# Patient Record
Sex: FEMALE | Race: WHITE | Employment: FULL TIME | URBAN - METROPOLITAN AREA
[De-identification: names, ages, dates, MRNs, and addresses within clinical notes are randomized per-mention and may not be internally consistent; named-entity substitution may affect disease eponyms.]

---

## 2017-02-03 RX ORDER — SUMATRIPTAN 100 MG/1
TABLET, FILM COATED ORAL
Qty: 12 TAB | Refills: 0 | Status: SHIPPED | OUTPATIENT
Start: 2017-02-03 | End: 2017-07-11 | Stop reason: SDUPTHER

## 2017-03-15 ENCOUNTER — HOSPITAL ENCOUNTER (OUTPATIENT)
Dept: LAB | Age: 52
Discharge: HOME | End: 2017-03-15
Payer: COMMERCIAL

## 2017-03-15 ENCOUNTER — OFFICE VISIT (OUTPATIENT)
Dept: FAMILY MEDICINE CLINIC | Age: 52
End: 2017-03-15

## 2017-03-15 VITALS
DIASTOLIC BLOOD PRESSURE: 87 MMHG | RESPIRATION RATE: 16 BRPM | HEIGHT: 70 IN | WEIGHT: 258 LBS | TEMPERATURE: 98.2 F | HEART RATE: 68 BPM | SYSTOLIC BLOOD PRESSURE: 138 MMHG | OXYGEN SATURATION: 97 % | BODY MASS INDEX: 36.94 KG/M2

## 2017-03-15 DIAGNOSIS — Z12.39 BREAST CANCER SCREENING: ICD-10-CM

## 2017-03-15 DIAGNOSIS — Z01.419 WELL WOMAN EXAM WITH ROUTINE GYNECOLOGICAL EXAM: ICD-10-CM

## 2017-03-15 DIAGNOSIS — Z12.4 CERVICAL CANCER SCREENING: ICD-10-CM

## 2017-03-15 DIAGNOSIS — R13.10 DYSPHAGIA, UNSPECIFIED TYPE: ICD-10-CM

## 2017-03-15 DIAGNOSIS — Z01.419 WELL WOMAN EXAM WITH ROUTINE GYNECOLOGICAL EXAM: Primary | ICD-10-CM

## 2017-03-15 PROBLEM — R13.11 ORAL PHASE DYSPHAGIA: Status: ACTIVE | Noted: 2017-03-15

## 2017-03-15 PROBLEM — Z12.11 COLON CANCER SCREENING: Status: ACTIVE | Noted: 2017-03-15

## 2017-03-15 PROBLEM — R91.1 LUNG NODULE: Status: ACTIVE | Noted: 2017-03-15

## 2017-03-15 PROCEDURE — 87624 HPV HI-RISK TYP POOLED RSLT: CPT | Performed by: FAMILY MEDICINE

## 2017-03-15 PROCEDURE — 88175 CYTOPATH C/V AUTO FLUID REDO: CPT | Performed by: FAMILY MEDICINE

## 2017-03-15 NOTE — PROGRESS NOTES
Chief Complaint   Patient presents with    Well Woman     1. Have you been to the ER, urgent care clinic since your last visit? Hospitalized since your last visit? no    2. Have you seen or consulted any other health care providers outside of the [facility] since your last visit? Include any pap smears or colon screening.  no

## 2017-03-15 NOTE — MR AVS SNAPSHOT
Visit Information Date & Time Provider Department Dept. Phone Encounter #  
 3/15/2017  9:20 AM Adarsh Yeboah, [facility] [phone] [id number] Upcoming Health Maintenance Date Due DTaP/Tdap/Td series (1 - Tdap) 12/27/1987 PAP AKA CERVICAL CYTOLOGY 6/12/2016 INFLUENZA AGE 9 TO ADULT 8/1/2016 FOBT Q 1 YEAR AGE 50-75 12/27/2016 BREAST CANCER SCRN MAMMOGRAM 11/4/2017 Allergies as of 3/15/2017  Review Complete On: 3/15/2017 By: Moshe Cade LPN No Known Allergies Current Immunizations  Reviewed on 12/9/2014 Name Date Influenza Vaccine 11/10/2014 Not reviewed this visit You Were Diagnosed With   
  
 Codes Comments Well woman exam with routine gynecological exam    -  Primary ICD-10-CM: K40.567 ICD-9-CM: V72.31 Dysphagia, unspecified type     ICD-10-CM: R13.10 ICD-9-CM: 787.20 Vitals BP Pulse Temp Resp Height(growth percentile) Weight(growth percentile) 138/87 (BP 1 Location: Left arm, BP Patient Position: Sitting) 68 98.2 °F (36.8 °C) (Oral) 16 5' 10\" (1.778 m) 258 lb (117 kg) LMP SpO2 BMI OB Status Smoking Status 02/10/2017 97% 37.02 kg/m2 Having regular periods Never Smoker Vitals History BMI and BSA Data Body Mass Index Body Surface Area 37.02 kg/m 2 2.4 m 2 Preferred Pharmacy Pharmacy Name Phone [facility] [address]  [facility] [phone] Your Updated Medication List  
  
   
This list is accurate as of: 3/15/17 10:09 AM.  Always use your most recent med list.  
  
  
  
  
 SUMAtriptan 100 mg tablet Commonly known as:  IMITREX  
TAKE 1 TABLET ONCE AS NEEDED FOR MIGRAINE FOR ONE DOSE We Performed the Following CBC WITH AUTOMATED DIFF [07842 CPT(R)] LIPID PANEL [51088 CPT(R)] METABOLIC PANEL, COMPREHENSIVE [37460 CPT(R)] PAP IG, APTIMA HPV AND RFX 16/18,45 ([id number]) [[id number] Custom] REFERRAL TO GASTROENTEROLOGY [JJW85 Custom] Comments:  
 Please evaluate patient for difficulty swallowing TSH 3RD GENERATION [08320 CPT(R)] VITAMIN D, 25 HYDROXY [id number] CPT(R)] To-Do List   
 04/14/2017 Imaging:  MUMTAZ MAMMO BI SCREENING INCL CAD Referral Information Referral ID Referred By Referred To  
  
 [id number] [facility] 030 66 [phone] 83 [place], [facility] Vis[facility] Status Start Date End Date 1 New Request 3/15/17 3/15/18 If your referral has a status of pending review or denied, additional information will be sent to support the outcome of this decision. Introducing [facility]! Dear Louann Wellington: Thank you for requesting a [employer] account. Our records indicate that you already have an active [employer] account. You can access your account anytime at [email or web address] Did you know that you can access your hospital and ER discharge instructions at any time in [employer]? You can also review all of your test results from your hospital stay or ER visit. Additional Information If you have questions, please visit the Frequently Asked Questions section of the [employer] website at [email or web address] Remember, [employer] is NOT to be used for urgent needs. For medical emergencies, dial 911. Now available from your iPhone and Android! Please provide this summary of care documentation to your next provider. Your primary care clinician is listed as [address] [place] [address]. If you have any questions after today's visit, please call [phone].

## 2017-03-15 NOTE — PROGRESS NOTES
Presents for annual exam    States that \"life is ok\"    Health wise -- nothing chronic or different    States that she is craving salt    States that she has not been sick    States that periods are tapering off  Not bleeding like second half of last year  Last annual exam was in 2015   Periods were very regular at 24 days  Now stretched out to six weeks  Sometimes will completely miss a month  Mother went through [facility] [employer], [facility] in her 52's    Denies any breast problems    Always had digestive problems    Always trying to do weight loss program    Colonoscopy was done 6-7 years ago  Had EGD and colonoscopy  Swallowed food and does not go down  Notice it with certain foods -- only happens with certain foods -- has been going on for years  Able to still breathe  Was also having blood in her stool at the time that colonoscopy was done  Does not feel like she is choking    Migraine headaches are much better    Works for [employer] around the [address] [place] [address] in [employer] [facility] [employer] [facility] [place]    4 mm pulmonary nodule seen on CT scan abd 2013 -- per radiology, no followup required due to lack of risk factors (non-smoker, denies second hand exposure)    Subjective:   49 y.o. female for Well Woman Check. Patient's last menstrual period was 02/10/2017. Social History: single partner, contraception - vasectomy. Pertinent past medical history:    Patient Active Problem List   Diagnosis Code    Depression F32.9    Migraine headache G43.909    Lung nodule R91.1    Breast cancer screening Z12.39    Colon cancer screening Z12.11    Oral phase dysphagia R13.11     Current Outpatient Prescriptions   Medication Sig Dispense Refill    SUMAtriptan (IMITREX) 100 mg tablet TAKE 1 TABLET ONCE AS NEEDED FOR MIGRAINE FOR ONE DOSE 12 Tab 0     No Known Allergies     ROS:  Feeling well. No dyspnea or chest pain on exertion. No abdominal pain, change in bowel habits, black or bloody stools.   + difficulty swallowing at times; has had EGD which was normal a few years ago. No urinary tract symptoms. GYN ROS: no breast pain or new or enlarging lumps on self exam, she complains of irregular menses. No neurological complaints. Objective:     Visit Vitals    /87 (BP 1 Location: Left arm, BP Patient Position: Sitting)    Pulse 68    Temp 98.2 °F (36.8 °C) (Oral)    Resp 16    Ht 5' 10\" (1.778 m)    Wt 258 lb (117 kg)    LMP 02/10/2017    SpO2 97%    BMI 37.02 kg/m2     The patient appears well, alert, oriented x 3, in no distress. ENT normal.  Neck supple. No adenopathy or thyromegaly. LAN. Lungs are clear, good air entry, no wheezes, rhonchi or rales. S1 and S2 normal, no murmurs, regular rate and rhythm. Abdomen obese, soft without tenderness, guarding, mass or organomegaly. Extremities show no edema, normal peripheral pulses. Neurological is normal, no focal findings. BREAST EXAM: breasts appear normal, no suspicious masses, no skin or nipple changes or axillary nodes    PELVIC EXAM: normal external genitalia, vulva, vagina, cervix, uterus and adnexa    Assessment/Plan:   well woman  mammogram  pap smear  return annually or prn    ICD-10-CM ICD-9-CM    1. Well woman exam with routine gynecological exam Z01.419 V72.31 PAP IG, APTIMA HPV AND RFX 16/18,45 ([id number])      LIPID PANEL      METABOLIC PANEL, COMPREHENSIVE      CBC WITH AUTOMATED DIFF      MUMTAZ MAMMO BI SCREENING INCL CAD      VITAMIN D, 25 HYDROXY      TSH 3RD GENERATION   2. Dysphagia, unspecified type R13.10 787.20 REFERRAL TO GASTROENTEROLOGY   3. Breast cancer screening Z12.39 V76.10    4. Cervical cancer screening Z12.4 V76.2    5.  Well woman exam with routine gynecological exam Z01.419 V72.31 PAP IG, APTIMA HPV AND RFX 16/18,45 ([id number])      LIPID PANEL      METABOLIC PANEL, COMPREHENSIVE      CBC WITH AUTOMATED DIFF      MUMTAZ MAMMO BI SCREENING INCL CAD      VITAMIN D, 25 HYDROXY      TSH 3RD GENERATION    [V72.31]

## 2017-03-15 NOTE — LETTER
3/21/2017 1:32 PM 
 
Ms. Smallstara Anchors [address] [facility] [phone] Dear Claudell Anchors: 
 
Please find your most recent results below. Resulted Orders CX-VAG CYTOLOGY Narrative Marco Antonio [address] [place] [address] Pkwy      [address]         [address] [facility], [address]     [place], [address] [place] [address] 
[phone] Fax# [phone]    Fax# [phone]      Fax# [phone] 
 
========================================================================== 
                       * * * CYTOPATHOLOGY REPORT* * *   
========================================================================== 
GYNECOLOGICAL * * *Procedures/Addenda Present * * * Patient:  Isidro Balwinder             Specimen #:  [id number] Age:  1965 (Age: 49)                Date of Procedure: 3/15/2017 Sex:  F                                  Date of Receipt:  3/16/2017 Hospital#:  [id number]               Date of Report: 3/20/2017 Med. Record #:  [id number] Location:  [facility]) Physician(s):  Jaki Bird MD 
 
   
 
* * * CLINICAL HISTORY* * * Date of Last Menstrual Period:  02/10/2017 ADDITIONAL PATIENT INFORMATION:  
RFX 12 , 25 / 39 HPV REGARDLESS:  
YES  
SOURCE: 
A: Cervical/Endocervical Imaged Processed Thin Prep 
 
 
============================================================================ 
                                * * * FINAL INTERPRETATION* * * Cervical/Endocervical Imaged Processed Thin Prep Satisfactory for evaluation. NEGATIVE FOR INTRAEPITHELIAL LESION OR MALIGNANCY. [place] Angel,  CT (AS Jonathan Lozano  HPV HR                     
      
 
 
 Interpretation Test: HPV, high-risk Result: NEGATIVE Reference Interval: Negative This high-risk HPV test detects fourteen high-risk types 
(16/18/31/33/35/39/45/51/52/56/58/59/66/68) without differentiation, using 
nucleic acid amplification method. Test performed by: [address] [place] [address]  Dr. Derek Boyle, [facility] Phone number:  [phone] Candance Huger * * *Electronically Signed* * * 
3/20/2017 ICD10 Codes: 
 Q10.510 The Pap test is a screening procedure to aid in the detection of cervical 
cancer and its precursors. It should not be used as the sole means of 
detecting cervical cancer. As with any laboratory test, false negative 
and false positive results are known to occur. RECOMMENDATIONS: 
None. Keep up the good work! Negative PAP smear and negative HPV HR Please call me if you have any questions: [phone] Sincerely, 
Dorian No

## 2017-03-16 LAB
25(OH)D3+25(OH)D2 SERPL-MCNC: 18.3 NG/ML (ref 30–100)
ALBUMIN SERPL-MCNC: 4.2 G/DL (ref 3.5–5.5)
ALBUMIN/GLOB SERPL: 1.9 {RATIO} (ref 1.2–2.2)
ALP SERPL-CCNC: 60 IU/L (ref 39–117)
ALT SERPL-CCNC: 33 IU/L (ref 0–32)
AST SERPL-CCNC: 24 IU/L (ref 0–40)
BASOPHILS # BLD AUTO: 0 X10E3/UL (ref 0–0.2)
BASOPHILS NFR BLD AUTO: 0 %
BILIRUB SERPL-MCNC: 0.4 MG/DL (ref 0–1.2)
BUN SERPL-MCNC: 9 MG/DL (ref 6–24)
BUN/CREAT SERPL: 12 (ref 9–23)
CALCIUM SERPL-MCNC: 9.4 MG/DL (ref 8.7–10.2)
CHLORIDE SERPL-SCNC: 101 MMOL/L (ref 96–106)
CHOLEST SERPL-MCNC: 140 MG/DL (ref 100–199)
CO2 SERPL-SCNC: 23 MMOL/L (ref 18–29)
CREAT SERPL-MCNC: 0.74 MG/DL (ref 0.57–1)
EOSINOPHIL # BLD AUTO: 0 X10E3/UL (ref 0–0.4)
EOSINOPHIL NFR BLD AUTO: 1 %
ERYTHROCYTE [DISTWIDTH] IN BLOOD BY AUTOMATED COUNT: 14.9 % (ref 12.3–15.4)
GLOBULIN SER CALC-MCNC: 2.2 G/DL (ref 1.5–4.5)
GLUCOSE SERPL-MCNC: 100 MG/DL (ref 65–99)
HCT VFR BLD AUTO: 38.1 % (ref 34–46.6)
HDLC SERPL-MCNC: 59 MG/DL
HGB BLD-MCNC: 12.8 G/DL (ref 11.1–15.9)
IMM GRANULOCYTES # BLD: 0 X10E3/UL (ref 0–0.1)
IMM GRANULOCYTES NFR BLD: 0 %
INTERPRETATION, 910389: NORMAL
LDLC SERPL CALC-MCNC: 62 MG/DL (ref 0–99)
LYMPHOCYTES # BLD AUTO: 1.9 X10E3/UL (ref 0.7–3.1)
LYMPHOCYTES NFR BLD AUTO: 37 %
MCH RBC QN AUTO: 28.9 PG (ref 26.6–33)
MCHC RBC AUTO-ENTMCNC: 33.6 G/DL (ref 31.5–35.7)
MCV RBC AUTO: 86 FL (ref 79–97)
MONOCYTES # BLD AUTO: 0.4 X10E3/UL (ref 0.1–0.9)
MONOCYTES NFR BLD AUTO: 8 %
NEUTROPHILS # BLD AUTO: 2.8 X10E3/UL (ref 1.4–7)
NEUTROPHILS NFR BLD AUTO: 54 %
PLATELET # BLD AUTO: 206 X10E3/UL (ref 150–379)
POTASSIUM SERPL-SCNC: 4.7 MMOL/L (ref 3.5–5.2)
PROT SERPL-MCNC: 6.4 G/DL (ref 6–8.5)
RBC # BLD AUTO: 4.43 X10E6/UL (ref 3.77–5.28)
SODIUM SERPL-SCNC: 137 MMOL/L (ref 134–144)
TRIGL SERPL-MCNC: 96 MG/DL (ref 0–149)
TSH SERPL DL<=0.005 MIU/L-ACNC: 0.77 UIU/ML (ref 0.45–4.5)
VLDLC SERPL CALC-MCNC: 19 MG/DL (ref 5–40)
WBC # BLD AUTO: 5.2 X10E3/UL (ref 3.4–10.8)

## 2017-03-17 RX ORDER — ERGOCALCIFEROL 1.25 MG/1
50000 CAPSULE ORAL
Qty: 12 CAP | Refills: 0 | Status: SHIPPED | OUTPATIENT
Start: 2017-03-17 | End: 2017-07-11 | Stop reason: SDUPTHER

## 2017-03-17 NOTE — PROGRESS NOTES
Normal cholesterol  Normal kidney, liver, and glucose   No anemia  Low vitamin D level    Will send in high dose vitamin D to pharmacy, to be taken weekly for 12 weeks, then 1000 international units per day.

## 2017-06-29 ENCOUNTER — HOSPITAL ENCOUNTER (OUTPATIENT)
Dept: MAMMOGRAPHY | Age: 52
Discharge: HOME | End: 2017-06-29
Attending: FAMILY MEDICINE
Payer: COMMERCIAL

## 2017-06-29 DIAGNOSIS — Z01.419 WELL WOMAN EXAM WITH ROUTINE GYNECOLOGICAL EXAM: ICD-10-CM

## 2017-06-29 PROCEDURE — 77067 SCR MAMMO BI INCL CAD: CPT

## 2017-06-30 DIAGNOSIS — R79.89 LOW SERUM VITAMIN D: Primary | ICD-10-CM

## 2017-07-02 RX ORDER — ERGOCALCIFEROL 1.25 MG/1
CAPSULE ORAL
Qty: 12 CAP | Refills: 0 | OUTPATIENT
Start: 2017-07-02

## 2017-07-02 NOTE — TELEPHONE ENCOUNTER
May not need to continue high dose   May be able to take [phone] international units per day  Needs blood test (vitamin D level)

## 2017-07-07 ENCOUNTER — TELEPHONE (OUTPATIENT)
Dept: FAMILY MEDICINE CLINIC | Age: 52
End: 2017-07-07

## 2017-07-07 NOTE — TELEPHONE ENCOUNTER
Patient called to say the pharmacy told her the medication refill of Vitamin D was denied. She called to ask for the reason why. Informed her a message would be sent to nurse and asked if she had read her my chart e-mail from the office. She said \"no\".

## 2017-07-07 NOTE — TELEPHONE ENCOUNTER
Spoke to patient regarding medication denial. She understands she needs to return for lab work ing which she has made an appointment for.

## 2017-07-10 ENCOUNTER — LAB ONLY (OUTPATIENT)
Dept: FAMILY MEDICINE CLINIC | Age: 52
End: 2017-07-10

## 2017-07-10 DIAGNOSIS — E55.9 VITAMIN D DEFICIENCY: ICD-10-CM

## 2017-07-10 DIAGNOSIS — R79.89 LOW SERUM VITAMIN D: Primary | ICD-10-CM

## 2017-07-10 NOTE — LETTER
7/13/2017 2:44 PM 
 
Ms. Ketty Mora [address] [facility] [phone] Dear Ketty Mora: 
 
Please find your most recent results below. Resulted Orders VITAMIN D, 25 HYDROXY Result Value Ref Range VITAMIN D, 25-HYDROXY 22.3 (L) 30.0 - 100.0 ng/mL Comment:  
   Vitamin D deficiency has been defined by the [facility] practice guideline as a 
level of serum 25-OH vitamin D less than 20 ng/mL (1,2). The Endocrine Society went on to further define vitamin D 
insufficiency as a level between 21 and 29 ng/mL (2). 1. IOM ([place] of Medicine). 2010. Dietary reference 
   intakes for calcium and D. [facility]: The 
   [facility]. 2. Sukh MF, Trent [place], Eun LARSEN, et al. 
   Evaluation, treatment, and prevention of vitamin D 
   deficiency: an Endocrine Society clinical practice 
   guideline. JCEM. 2011 Jul; 96(7):1911-30. Narrative Performed at:  [address]  [id number] : Víctor Mims MD, Phone:  [phone] RECOMMENDATIONS: 
 
   
Vitamin D is still very low Will order high dose vitamin D for three months -- then can recheck blood level Please call me if you have any questions: [phone] Sincerely, Lab Sffp

## 2017-07-11 DIAGNOSIS — E55.9 VITAMIN D DEFICIENCY: Primary | ICD-10-CM

## 2017-07-11 LAB — 25(OH)D3+25(OH)D2 SERPL-MCNC: 22.3 NG/ML (ref 30–100)

## 2017-07-11 RX ORDER — ERGOCALCIFEROL 1.25 MG/1
50000 CAPSULE ORAL
Qty: 12 CAP | Refills: 0 | Status: SHIPPED | OUTPATIENT
Start: 2017-07-11 | End: 2019-01-16

## 2017-07-11 NOTE — PROGRESS NOTES
Vitamin D is still very low  Will order high dose vitamin D for three months -- then can recheck blood level

## 2017-07-12 RX ORDER — SUMATRIPTAN 100 MG/1
TABLET, FILM COATED ORAL
Qty: 12 TAB | Refills: 0 | Status: SHIPPED | OUTPATIENT
Start: 2017-07-12

## 2017-08-10 ENCOUNTER — TELEPHONE (OUTPATIENT)
Dept: FAMILY MEDICINE CLINIC | Age: 52
End: 2017-08-10

## 2017-08-10 NOTE — TELEPHONE ENCOUNTER
Patient states per Lab bev there was no condition code attached to vitamin D draw and which she had to pay total cost of $232.00      Please call [phone]

## 2017-08-15 NOTE — TELEPHONE ENCOUNTER
Tried to call [patient X2. Information faxed to [employer] and asked to re-file with insurance.

## 2018-05-23 ENCOUNTER — OFFICE VISIT (OUTPATIENT)
Dept: FAMILY MEDICINE CLINIC | Age: 53
End: 2018-05-23

## 2018-05-23 VITALS
DIASTOLIC BLOOD PRESSURE: 78 MMHG | SYSTOLIC BLOOD PRESSURE: 126 MMHG | RESPIRATION RATE: 18 BRPM | HEIGHT: 70 IN | BODY MASS INDEX: 36.94 KG/M2 | TEMPERATURE: 97.7 F | HEART RATE: 80 BPM | WEIGHT: 258 LBS | OXYGEN SATURATION: 98 %

## 2018-05-23 DIAGNOSIS — K30 INDIGESTION: ICD-10-CM

## 2018-05-23 DIAGNOSIS — R79.89 LOW SERUM VITAMIN D: ICD-10-CM

## 2018-05-23 DIAGNOSIS — R53.83 FATIGUE, UNSPECIFIED TYPE: ICD-10-CM

## 2018-05-23 DIAGNOSIS — Z01.419 WELL WOMAN EXAM: Primary | ICD-10-CM

## 2018-05-23 RX ORDER — CHOLECALCIFEROL TAB 125 MCG (5000 UNIT) 125 MCG
TAB ORAL DAILY
COMMUNITY
End: 2019-01-16

## 2018-05-23 RX ORDER — RANITIDINE 300 MG/1
300 TABLET ORAL DAILY
Qty: 30 TAB | Refills: 0 | Status: SHIPPED | OUTPATIENT
Start: 2018-05-23 | End: 2019-01-16

## 2018-05-23 NOTE — PROGRESS NOTES
Chief Complaint   Patient presents with    Fatigue     for about 2 months    Indigestion     1 month     1. Have you been to the ER, urgent care clinic since your last visit? Hospitalized since your last visit? No    2. Have you seen or consulted any other health care providers outside of the [address] [place] [address] since your last visit? Include any pap smears or colon screening.  No

## 2018-05-23 NOTE — MR AVS SNAPSHOT
[address] 
[phone] Patient: Claire Albarran MRN: [id number] :1965 Visit Information Date & Time Provider Department Dept. Phone Encounter #  
 2018 11:00 AM Thiago RIDER Travis Fox, [address] [place] [address] [phone] [id number] Follow-up Instructions Return in about 1 year (around 2019), or if symptoms worsen or fail to improve. Upcoming Health Maintenance Date Due FOBT Q 1 YEAR AGE 50-75 2016 DTaP/Tdap/Td series (1 - Tdap) 2019* Influenza Age 5 to Adult 2018 BREAST CANCER SCRN MAMMOGRAM 2019 PAP AKA CERVICAL CYTOLOGY 3/15/2020 *Topic was postponed. The date shown is not the original due date. Allergies as of 2018  Review Complete On: 2018 By: Chivo Mehta LPN No Known Allergies Current Immunizations  Reviewed on 2014 Name Date Influenza Vaccine 11/10/2014 Not reviewed this visit You Were Diagnosed With   
  
 Codes Comments Well woman exam    -  Primary ICD-10-CM: J48.016 ICD-9-CM: V72.31   
 BMI 37.0-37.9, adult     ICD-10-CM: Z68.37 ICD-9-CM: V85.37 Fatigue, unspecified type     ICD-10-CM: R53.83 ICD-9-CM: 780.79 Indigestion     ICD-10-CM: K30 ICD-9-CM: 536.8 Low serum vitamin D     ICD-10-CM: R79.89 ICD-9-CM: 790.6 Vitals BP Pulse Temp Resp Height(growth percentile) Weight(growth percentile) 126/78 (BP 1 Location: Right arm, BP Patient Position: Sitting) 80 97.7 °F (36.5 °C) (Oral) 18 5' 10\" (1.778 m) 258 lb (117 kg) LMP SpO2 BMI OB Status Smoking Status 03/15/2018 (Approximate) 98% 37.02 kg/m2 Having regular periods Never Smoker Vitals History BMI and BSA Data Body Mass Index Body Surface Area 37.02 kg/m 2 2.4 m 2 Preferred Pharmacy Pharmacy Name Phone [facility]ORE [address] AT  [facility] [phone] Your Updated Medication List  
  
   
This list is accurate as of 5/23/18 11:53 AM.  Always use your most recent med list.  
  
  
  
  
 cholecalciferol (VITAMIN D3) 5,000 unit Tab tablet Commonly known as:  VITAMIN D3 Take  by mouth daily. ergocalciferol 50,000 unit capsule Commonly known as:  ERGOCALCIFEROL Take 1 Cap by mouth every seven (7) days. raNITIdine 300 mg tablet Commonly known as:  ZANTAC Take 1 Tab by mouth daily. SUMAtriptan 100 mg tablet Commonly known as:  IMITREX  
TAKE 1 TABLET ONCE AS NEEDED FOR MIGRAINE FOR ONE DOSE Prescriptions Sent to Pharmacy Refills  
 raNITIdine (ZANTAC) 300 mg tablet 0 Sig: Take 1 Tab by mouth daily. Class: Normal  
 [facility] [place] [facility] [address] AT [facility] of [address] [address] ([facility] Ph #: [phone] Route: Oral  
  
We Performed the Following CBC W/O DIFF [90015 CPT(R)] LIPID PANEL [77430 CPT(R)] METABOLIC PANEL, COMPREHENSIVE [44947 CPT(R)] OCCULT BLOOD, IMMUNOASSAY (FIT) [id number] CPT(R)] REFERRAL TO SLEEP STUDIES [REF99 Custom] Comments:  
 Please evaluate patient for sleep apnea. .  
 TSH RFX ON ABNORMAL TO FREE T4 [[id number] Custom] VITAMIN B12 [id number] CPT(R)] VITAMIN D, 25 HYDROXY [id number] CPT(R)] Follow-up Instructions Return in about 1 year (around 5/23/2019), or if symptoms worsen or fail to improve. Referral Information Referral ID Referred By Referred To  
  
 [id number] Ady PAYTON [address] Phone: [phone] Fax: [phone] Visits Status Start Date End Date 1 New Request 5/23/18 5/23/19  If your referral has a status of pending review or denied, additional information will be sent to support the outcome of this decision. Patient Instructions Well Visit, Women 48 to 72: Care Instructions Your Care Instructions Physical exams can help you stay healthy. Your doctor has checked your overall health and may have suggested ways to take good care of yourself. He or she also may have recommended tests. At home, you can help prevent illness with healthy eating, regular exercise, and other steps. Follow-up care is a key part of your treatment and safety. Be sure to make and go to all appointments, and call your doctor if you are having problems. It's also a good idea to know your test results and keep a list of the medicines you take. How can you care for yourself at home? · Reach and stay at a healthy weight. This will lower your risk for many problems, such as obesity, diabetes, heart disease, and high blood pressure. · Get at least 30 minutes of exercise on most days of the week. Walking is a good choice. You also may want to do other activities, such as running, swimming, cycling, or playing tennis or team sports. · Do not smoke. Smoking can make health problems worse. If you need help quitting, talk to your doctor about stop-smoking programs and medicines. These can increase your chances of quitting for good. · Protect your skin from too much sun. When you're outdoors from 10 a.m. to 4 p.m., stay in the shade or cover up with clothing and a hat with a wide brim. Wear sunglasses that block UV rays. Even when it's cloudy, put broad-spectrum sunscreen (SPF 30 or higher) on any exposed skin. · See a dentist one or two times a year for checkups and to have your teeth cleaned. · Wear a seat belt in the car. · Limit alcohol to 1 drink a day. Too much alcohol can cause health problems. Follow your doctor's advice about when to have certain tests. These tests can spot problems early. · Cholesterol. Your doctor will tell you how often to have this done based on your age, family history, or other things that can increase your risk for heart attack and stroke. · Blood pressure. Have your blood pressure checked during a routine doctor visit. Your doctor will tell you how often to check your blood pressure based on your age, your blood pressure results, and other factors. · Mammogram. Ask your doctor how often you should have a mammogram, which is an X-ray of your breasts. A mammogram can spot breast cancer before it can be felt and when it is easiest to treat. · Pap test and pelvic exam. Ask your doctor how often you should have a Pap test. You may not need to have a Pap test as often as you used to. · Vision. Have your eyes checked every year or two or as often as your doctor suggests. Some experts recommend that you have yearly exams for glaucoma and other age-related eye problems starting at age 48. · Hearing. Tell your doctor if you notice any change in your hearing. You can have tests to find out how well you hear. · Diabetes. Ask your doctor whether you should have tests for diabetes. · Colon cancer. You should begin tests for colon cancer at age 48. You may have one of several tests. Your doctor will tell you how often to have tests based on your age and risk. Risks include whether you already had a precancerous polyp removed from your colon or whether your parents, sisters and brothers, or children have had colon cancer. · Thyroid disease. Talk to your doctor about whether to have your thyroid checked as part of a regular physical exam. Women have an increased chance of a thyroid problem. · Osteoporosis. You should begin tests for bone density at age 72. If you are younger than 72, ask your doctor whether you have factors that may increase your risk for this disease. You may want to have this test before age 72. · Heart attack and stroke risk. At least every 4 to 6 years, you should have your risk for heart attack and stroke assessed. Your doctor uses factors such as your age, blood pressure, cholesterol, and whether you smoke or have diabetes to show what your risk for a heart attack or stroke is over the next 10 years. When should you call for help? Watch closely for changes in your health, and be sure to contact your doctor if you have any problems or symptoms that concern you. Where can you learn more? Go to http://lalo-santos.info/. Enter J812 in the search box to learn more about \"Well Visit, Women 50 to 72: Care Instructions. \" Current as of: May 12, 2017 Content Version: 11.4 © 2000-6105 [employer]. Care instructions adapted under license by [employer] (which disclaims liability or warranty for this information). If you have questions about a medical condition or this instruction, always ask your healthcare professional. [place] [address] any warranty or liability for your use of this information. Introducing [facility]! Dear Ashley Pinto: Thank you for requesting a [employer] account. Our records indicate that you already have an active [employer] account. You can access your account anytime at [email or web address] Did you know that you can access your hospital and ER discharge instructions at any time in [employer]Open? You can also review all of your test results from your hospital stay or ER visit. Additional Information If you have questions, please visit the Frequently Asked Questions section of the [employer] website at [email or web address] Remember, [employer] is NOT to be used for urgent needs. For medical emergencies, dial 911. Now available from your iPhone and Android! Please provide this summary of care documentation to your next provider. Your primary care clinician is listed as [address] [place] [address]. If you have any questions after today's visit, please call [phone].

## 2018-05-23 NOTE — PROGRESS NOTES
HPI:  Uma Matos is a 47 y.o. female presenting for well woman exam.     Patients feels fatigue, fall asleep during the day. She does not snore a lot at night. Sleeps 11pm-6 am throughout. Linzie Fast a lot and may affect sleep cycle. Feels indigestion, stomach pain in epigastric area, mostly at night, 5-6/10,  No radiation, feels like Gas. Thumbs helps with the indigestion. No N/V diarrhea. Has a history of dysphagia and s/p esophageal ring dilation? Diet:  Weight watcher, juice cleanses, none worked for her weight. Exercise: 2 miles walking a day except for past 2 months due to knee injury    Does not smoke, drinks socially      Periods premenopaused  Sexually active?: yes  Number of sexual partners:   Method of family planning: Vasectomy in       Allergies- reviewed:   No Known Allergies      Medications- reviewed:   Current Outpatient Prescriptions   Medication Sig    cholecalciferol, VITAMIN D3, (VITAMIN D3) 5,000 unit tab tablet Take  by mouth daily.  raNITIdine (ZANTAC) 300 mg tablet Take 1 Tab by mouth daily.  SUMAtriptan (IMITREX) 100 mg tablet TAKE 1 TABLET ONCE AS NEEDED FOR MIGRAINE FOR ONE DOSE    ergocalciferol (ERGOCALCIFEROL) 50,000 unit capsule Take 1 Cap by mouth every seven (7) days. No current facility-administered medications for this visit.           Past Medical History- reviewed:  Past Medical History:   Diagnosis Date    Alopecia 2010    Depression 2010    Kidney stone     Kidney stone     Migraine headache 2010    Nodule of left lung 5-20-13    left lower lobe 4mm          Past Surgical History- reviewed:   Past Surgical History:   Procedure Laterality Date    HX CHOLECYSTECTOMY      HX HERNIA REPAIR      HX TONSILLECTOMY           Family History - reviewed:  Family History   Problem Relation Age of Onset   Afsaneh Barrs Arthritis-osteo Mother     Hypertension Father     Heart Disease Father     Heart Failure Father Social History - reviewed:  Social History     Social History    Marital status:      Spouse name: N/A    Number of children: N/A    Years of education: N/A     Occupational History    Not on file. Social History Main Topics    Smoking status: Never Smoker    Smokeless tobacco: Never Used    Alcohol use Yes      Comment: VERY RARELY    Drug use: No    Sexual activity: Yes     Partners: Male      Comment:  HAS VASTECTOMY     Other Topics Concern    Not on file     Social History Narrative         Immunizations - reviewed:   Immunization History   Administered Date(s) Administered    Influenza Vaccine 11/10/2014       Tdap: refused        Health Maintenance reviewed -  Pap smear utd  Mammogram utd  Colonoscopy normal a year ago. EGD: esophageal ring dilated last year         Review of systems:  Items bolded if positive. Constitutional: Fever, chills, night sweats, weight loss, lymphadenopathy, fatigue  HEENT: Vision change, eye pain, rhinorrhea, sinus pain, epistaxis, dysphagia, change in hearing, tinnitus, vertigo.    Endocrine: Weight change, heat/ cold intolerance, tremor, insomnia, polyuria, polydipsia, polyphagia, abnl hair growth, nail changes  Cardiovascular: Chest pain, palpitations, syncope, lower extremity edema, orthopnea, paroxysmal nocturnal dyspnea  Pulmonary: Shortness of breath, dyspnea on exertion, cough, hemoptysis, wheezing  GI: Nausea, vomiting, diarrhea, melena, hematochezia, change in appetite, abdominal pain, change in bowel habits or stools  : Dysuria, frequency, urgency, incontinence, hematuria, nocturia  Musculoskeletal: joint swelling or pain, muscle pain, back pain  Skin:  Rash, New/growing/changing skin lesions  Neurologic: Headache, muscle weakness, paresthesias, anesthesia, ataxia, change in speech, change in gait   Psychiatric: depression, anxiety, hallucinations, cliff, SI/HI  BREASTS: No masses or nipple discharge      Physical Exam  Visit Vitals    /78 (BP 1 Location: Right arm, BP Patient Position: Sitting)    Pulse 80    Temp 97.7 °F (36.5 °C) (Oral)    Resp 18    Ht 5' 10\" (1.778 m)    Wt 258 lb (117 kg)    LMP 03/15/2018 (Approximate)    SpO2 98%    BMI 37.02 kg/m2       General  no distress, well developed, well nourished  HEENT  oropharynx clear and moist mucous membranes  Eyes  PERRL, EOMI and Conjunctivae Clear Bilaterally  Neck   full range of motion and supple. 15.5inches neck circumference  Respiratory  Clear Breath Sounds Bilaterally, No Increased Effort and Good Air Movement Bilaterally  Cardiovascular   RRR, S1S2, No murmur and Radial/Pedal Pulses 2+  Abdomen  soft, non tender and non distended  Skin  No Rash and Cap Refill less than 3 sec  Musculoskeletal no swelling or tenderness and strength normal and equal bilaterally  Neurology  AAO and CN II - XII grossly intact    Stopbang score of 3        Assessment/Plan:    ICD-10-CM ICD-9-CM    1. Well woman exam Z01.419 V72.31 CBC W/O DIFF      LIPID PANEL      METABOLIC PANEL, COMPREHENSIVE      CANCELED: METABOLIC PANEL, BASIC      CANCELED: OCCULT BLOOD, IMMUNOASSAY (FIT)   2. BMI 37.0-37.9, adult Z68.37 V85.37 TSH RFX ON ABNORMAL TO FREE T4   3. Fatigue, unspecified type G02.07 989.98 METABOLIC PANEL, COMPREHENSIVE      VITAMIN B12      REFERRAL TO SLEEP STUDIES   4. Indigestion K30 536.8 raNITIdine (ZANTAC) 300 mg tablet   5. Low serum vitamin D R79.89 790.6 VITAMIN D, 25 HYDROXY     1. Well woman exam  - refused Tdap  - CBC W/O DIFF  - LIPID PANEL  - METABOLIC PANEL, COMPREHENSIVE    2. BMI 37.0-37.9, adult  - encourage on diet and exercise  - TSH RFX ON ABNORMAL TO FREE T4    3. Fatigue, unspecified type   sleep apnea vs circadian rhythm dysregulation  - CBC, TSH  - METABOLIC PANEL, COMPREHENSIVE  - VITAMIN B12 and D levels  - REFERRAL TO SLEEP STUDIES    4. Indigestion  Improving with thumbs. Will add Zantac. Discussed the need to follow up with GI doctor.  Patient verbalized understanding to make appointment. - raNITIdine (ZANTAC) 300 mg tablet; Take 1 Tab by mouth daily. Dispense: 30 Tab; Refill: 0    5. Low serum vitamin D    - VITAMIN D, 25 HYDROXY      · Counseled re: diet, exercise, healthy lifestyle    · Appropriate labs, vaccines, imaging studies, and referrals ordered as listed above  I have reviewed/discussed the above normal BMI with the patient. I have recommended the following interventions: dietary management education, guidance, and counseling and monitor weight . Elinor Marino Follow-up Disposition:  Return in about 1 year (around 5/23/2019), or if symptoms worsen or fail to improve. I have discussed the diagnosis with the patient and the intended plan as seen in the above orders. Patient verbalized understanding of the plan and agrees with the plan. The patient has received an after-visit summary and questions were answered concerning future plans. I have discussed medication side effects and warnings with the patient as well. Informed patient to return to the office if new symptoms arise. Thiago Camacho MD  Family Medicine Resident

## 2018-05-23 NOTE — PATIENT INSTRUCTIONS
Well Visit, Women 48 to 72: Care Instructions  Your Care Instructions    Physical exams can help you stay healthy. Your doctor has checked your overall health and may have suggested ways to take good care of yourself. He or she also may have recommended tests. At home, you can help prevent illness with healthy eating, regular exercise, and other steps. Follow-up care is a key part of your treatment and safety. Be sure to make and go to all appointments, and call your doctor if you are having problems. It's also a good idea to know your test results and keep a list of the medicines you take. How can you care for yourself at home? · Reach and stay at a healthy weight. This will lower your risk for many problems, such as obesity, diabetes, heart disease, and high blood pressure. · Get at least 30 minutes of exercise on most days of the week. Walking is a good choice. You also may want to do other activities, such as running, swimming, cycling, or playing tennis or team sports. · Do not smoke. Smoking can make health problems worse. If you need help quitting, talk to your doctor about stop-smoking programs and medicines. These can increase your chances of quitting for good. · Protect your skin from too much sun. When you're outdoors from 10 a.m. to 4 p.m., stay in the shade or cover up with clothing and a hat with a wide brim. Wear sunglasses that block UV rays. Even when it's cloudy, put broad-spectrum sunscreen (SPF 30 or higher) on any exposed skin. · See a dentist one or two times a year for checkups and to have your teeth cleaned. · Wear a seat belt in the car. · Limit alcohol to 1 drink a day. Too much alcohol can cause health problems. Follow your doctor's advice about when to have certain tests. These tests can spot problems early. · Cholesterol.  Your doctor will tell you how often to have this done based on your age, family history, or other things that can increase your risk for heart attack and stroke. · Blood pressure. Have your blood pressure checked during a routine doctor visit. Your doctor will tell you how often to check your blood pressure based on your age, your blood pressure results, and other factors. · Mammogram. Ask your doctor how often you should have a mammogram, which is an X-ray of your breasts. A mammogram can spot breast cancer before it can be felt and when it is easiest to treat. · Pap test and pelvic exam. Ask your doctor how often you should have a Pap test. You may not need to have a Pap test as often as you used to. · Vision. Have your eyes checked every year or two or as often as your doctor suggests. Some experts recommend that you have yearly exams for glaucoma and other age-related eye problems starting at age 48. · Hearing. Tell your doctor if you notice any change in your hearing. You can have tests to find out how well you hear. · Diabetes. Ask your doctor whether you should have tests for diabetes. · Colon cancer. You should begin tests for colon cancer at age 48. You may have one of several tests. Your doctor will tell you how often to have tests based on your age and risk. Risks include whether you already had a precancerous polyp removed from your colon or whether your parents, sisters and brothers, or children have had colon cancer. · Thyroid disease. Talk to your doctor about whether to have your thyroid checked as part of a regular physical exam. Women have an increased chance of a thyroid problem. · Osteoporosis. You should begin tests for bone density at age 72. If you are younger than 72, ask your doctor whether you have factors that may increase your risk for this disease. You may want to have this test before age 72. · Heart attack and stroke risk. At least every 4 to 6 years, you should have your risk for heart attack and stroke assessed.  Your doctor uses factors such as your age, blood pressure, cholesterol, and whether you smoke or have diabetes to show what your risk for a heart attack or stroke is over the next 10 years. When should you call for help? Watch closely for changes in your health, and be sure to contact your doctor if you have any problems or symptoms that concern you. Where can you learn more? Go to http://lalo-santos.info/. Enter R023 in the search box to learn more about \"Well Visit, Women 50 to 72: Care Instructions. \"  Current as of: May 12, 2017  Content Version: 11.4  © 2486-1105 [employer]. Care instructions adapted under license by [employer] (which disclaims liability or warranty for this information). If you have questions about a medical condition or this instruction, always ask your healthcare professional. [address] any warranty or liability for your use of this information.

## 2018-05-24 NOTE — PROGRESS NOTES
I reviewed with the resident the medical history and the resident's findings on the physical examination. I discussed with the resident the patient's diagnosis and concur with the plan. Daytime fatigue. Referral to sleep study  Routine labs.    Health maintenance UTD

## 2018-05-25 LAB
25(OH)D3+25(OH)D2 SERPL-MCNC: 29.8 NG/ML (ref 30–100)
ALBUMIN SERPL-MCNC: 4.4 G/DL (ref 3.5–5.5)
ALBUMIN/GLOB SERPL: 1.9 {RATIO} (ref 1.2–2.2)
ALP SERPL-CCNC: 67 IU/L (ref 39–117)
ALT SERPL-CCNC: 17 IU/L (ref 0–32)
AST SERPL-CCNC: 16 IU/L (ref 0–40)
BILIRUB SERPL-MCNC: 0.4 MG/DL (ref 0–1.2)
BUN SERPL-MCNC: 13 MG/DL (ref 6–24)
BUN/CREAT SERPL: 19 (ref 9–23)
CALCIUM SERPL-MCNC: 9.8 MG/DL (ref 8.7–10.2)
CHLORIDE SERPL-SCNC: 102 MMOL/L (ref 96–106)
CHOLEST SERPL-MCNC: 172 MG/DL (ref 100–199)
CO2 SERPL-SCNC: 25 MMOL/L (ref 18–29)
CREAT SERPL-MCNC: 0.7 MG/DL (ref 0.57–1)
ERYTHROCYTE [DISTWIDTH] IN BLOOD BY AUTOMATED COUNT: 14.2 % (ref 12.3–15.4)
GFR SERPLBLD CREATININE-BSD FMLA CKD-EPI: 101 ML/MIN/1.73
GFR SERPLBLD CREATININE-BSD FMLA CKD-EPI: 116 ML/MIN/1.73
GLOBULIN SER CALC-MCNC: 2.3 G/DL (ref 1.5–4.5)
GLUCOSE SERPL-MCNC: 101 MG/DL (ref 65–99)
HCT VFR BLD AUTO: 39 % (ref 34–46.6)
HDLC SERPL-MCNC: 56 MG/DL
HGB BLD-MCNC: 12.6 G/DL (ref 11.1–15.9)
INTERPRETATION, 910389: NORMAL
LDLC SERPL CALC-MCNC: 83 MG/DL (ref 0–99)
MCH RBC QN AUTO: 28.6 PG (ref 26.6–33)
MCHC RBC AUTO-ENTMCNC: 32.3 G/DL (ref 31.5–35.7)
MCV RBC AUTO: 89 FL (ref 79–97)
PLATELET # BLD AUTO: 237 X10E3/UL (ref 150–379)
POTASSIUM SERPL-SCNC: 4.8 MMOL/L (ref 3.5–5.2)
PROT SERPL-MCNC: 6.7 G/DL (ref 6–8.5)
RBC # BLD AUTO: 4.4 X10E6/UL (ref 3.77–5.28)
SODIUM SERPL-SCNC: 142 MMOL/L (ref 134–144)
TRIGL SERPL-MCNC: 165 MG/DL (ref 0–149)
TSH SERPL DL<=0.005 MIU/L-ACNC: 1.03 UIU/ML (ref 0.45–4.5)
VIT B12 SERPL-MCNC: 675 PG/ML (ref 232–1245)
VLDLC SERPL CALC-MCNC: 33 MG/DL (ref 5–40)
WBC # BLD AUTO: 5.8 X10E3/UL (ref 3.4–10.8)

## 2018-08-14 ENCOUNTER — TELEPHONE (OUTPATIENT)
Dept: FAMILY MEDICINE CLINIC | Age: 53
End: 2018-08-14

## 2018-08-14 NOTE — TELEPHONE ENCOUNTER
----- Message from Bette Rowe sent at 8/14/2018 10:23 AM EDT -----  Regarding: Dr. Rosalinda Kelsey, , stated that Dino referred for him to contact billing for the testing on 5/24 Vitamin D 25 was coded wrong and would need to be resubmitted in order for insurance to cover testing.  's callback: [phone]

## 2018-08-16 NOTE — TELEPHONE ENCOUNTER
/Telephone  Received: Today       [facility] Front [facility]                     Pt's spouse Xiang Reason is calling, second request in reference to coding for vitamin D testing.      Best contact for the pt is [phone]

## 2019-01-02 LAB
CREATININE, EXTERNAL: 0.61
LDL-C, EXTERNAL: 111

## 2019-01-16 ENCOUNTER — OFFICE VISIT (OUTPATIENT)
Dept: FAMILY MEDICINE CLINIC | Age: 54
End: 2019-01-16

## 2019-01-16 VITALS
HEIGHT: 70 IN | OXYGEN SATURATION: 98 % | SYSTOLIC BLOOD PRESSURE: 141 MMHG | HEART RATE: 72 BPM | BODY MASS INDEX: 35.93 KG/M2 | WEIGHT: 251 LBS | DIASTOLIC BLOOD PRESSURE: 86 MMHG | TEMPERATURE: 98.4 F | RESPIRATION RATE: 16 BRPM

## 2019-01-16 DIAGNOSIS — N95.0 POSTMENOPAUSAL BLEEDING: Primary | ICD-10-CM

## 2019-01-16 DIAGNOSIS — Z12.39 BREAST CANCER SCREENING: ICD-10-CM

## 2019-01-16 NOTE — PROGRESS NOTES
1. Have you been to the ER, urgent care clinic since your last visit? Hospitalized since your last visit? No    2. Have you seen or consulted any other health care providers outside of the [address] [place] [address] since your last visit? Include any pap smears or colon screening. No    Chief Complaint   Patient presents with    Irregular Menses     Patient reports having period on December 18th 2018    Patient requests A1c test, states that fasting glucose over past eight years has been 100+    Blood pressure 141/86, pulse 72, temperature 98.4 °F (36.9 °C), temperature source Oral, resp. rate 16, height 5' 10\" (1.778 m), weight 251 lb (113.9 kg), last menstrual period 02/10/2017, SpO2 98 %.

## 2019-01-16 NOTE — PROGRESS NOTES
Subjective  Isidra Brown is an 47 y.o. female who presents for: postmenopausal bleeding    States that she has not had a period for about 1-1/2 years  States that she had a few heavy periods and then just stopped bleeding until she had a period in December that lasted for 5 days    States that she is not currently having any bleeding, however at times has felt somewhat dizzy    Last PAP smear 3/17 negative    States that her migraine headaches are better since menopause    Family Hx:  Mother - hospitalized for heavy bleeding at time of menopause; arthritis; unknown if she had a hysterectomy    Father --  -- MI, CAD, aneurysm    Allergies - reviewed:   No Known Allergies      Medications - reviewed:   Current Outpatient Medications   Medication Sig    SUMAtriptan (IMITREX) 100 mg tablet TAKE 1 TABLET ONCE AS NEEDED FOR MIGRAINE FOR ONE DOSE     No current facility-administered medications for this visit.         Problem List - reviewed:  Patient Active Problem List   Diagnosis Code    Depression F32.9    Migraine headache G43.909    Lung nodule R91.1    Breast cancer screening Z12.31    Colon cancer screening Z12.11    Oral phase dysphagia R13.11    Vitamin D deficiency E55.9    BMI 36.0-36.9,adult Z68.36         Past Medical History - reviewed:  Past Medical History:   Diagnosis Date    Alopecia 2010    Depression 2010    Kidney stone     Kidney stone     Migraine headache 2010    Nodule of left lung 5-20-13    left lower lobe 4mm          Past Surgical History - reviewed:   Past Surgical History:   Procedure Laterality Date    HX CHOLECYSTECTOMY      HX HERNIA REPAIR      HX TONSILLECTOMY           Social History - reviewed:  Social History     Socioeconomic History    Marital status:      Spouse name: Not on file    Number of children: Not on file    Years of education: Not on file    Highest education level: Not on file   Social Needs    Financial resource strain: Not on file    Food insecurity - worry: Not on file    Food insecurity - inability: Not on file    Transportation needs - medical: Not on file   Definigen needs - non-medical: Not on file   Occupational History    Not on file   Tobacco Use    Smoking status: Never Smoker    Smokeless tobacco: Never Used   Substance and Sexual Activity    Alcohol use: Yes     Comment: VERY RARELY    Drug use: No    Sexual activity: Yes     Partners: Male     Comment:  HAS VASTECTOMY   Other Topics Concern    Not on file   Social History Narrative    Not on file         Family History - reviewed:  Family History   Problem Relation Age of Onset    Arthritis-osteo Mother     Hypertension Father     Heart Disease Father     Heart Failure Father      Physical Exam  Visit Vitals  /86 (BP 1 Location: Right arm, BP Patient Position: Sitting)   Pulse 72   Temp 98.4 °F (36.9 °C) (Oral)   Resp 16   Ht 5' 10\" (1.778 m)   Wt 251 lb (113.9 kg)   LMP 02/10/2017   SpO2 98%   BMI 36.01 kg/m²       General appearance - alert, well appearing, and in no distress  Psych - normal mood and affect       Assessment/Plan    ICD-10-CM ICD-9-CM    1. Postmenopausal bleeding N95.0 627.1 CBC W/O DIFF      REFERRAL TO OBSTETRICS AND GYNECOLOGY   2. Breast cancer screening Z12.31 V76.10 [facility] 3D [facility] W MAMMO BI SCREENING INCL CAD       I have discussed the diagnosis with the patient and the intended plan as seen in the above orders. The patient has received an after-visit summary and questions were answered concerning future plans.         Nicole Infante MD

## 2019-01-17 LAB
ERYTHROCYTE [DISTWIDTH] IN BLOOD BY AUTOMATED COUNT: 14.6 % (ref 12.3–15.4)
HCT VFR BLD AUTO: 40.6 % (ref 34–46.6)
HGB BLD-MCNC: 13.7 G/DL (ref 11.1–15.9)
MCH RBC QN AUTO: 29.4 PG (ref 26.6–33)
MCHC RBC AUTO-ENTMCNC: 33.7 G/DL (ref 31.5–35.7)
MCV RBC AUTO: 87 FL (ref 79–97)
PLATELET # BLD AUTO: 215 X10E3/UL (ref 150–379)
RBC # BLD AUTO: 4.66 X10E6/UL (ref 3.77–5.28)
WBC # BLD AUTO: 6.3 X10E3/UL (ref 3.4–10.8)

## 2019-01-17 NOTE — PROGRESS NOTES
Normal Hgb/Hct  Spoke with patient by phone    She is to followup with Dr. Anmol Montejo regarding PMB    Also aware that she is due for mammogram

## 2019-01-25 ENCOUNTER — HOSPITAL ENCOUNTER (OUTPATIENT)
Dept: MAMMOGRAPHY | Age: 54
Discharge: HOME | End: 2019-01-25
Attending: FAMILY MEDICINE
Payer: COMMERCIAL

## 2019-01-25 DIAGNOSIS — Z12.39 BREAST CANCER SCREENING: ICD-10-CM

## 2019-01-25 PROCEDURE — 77063 BREAST TOMOSYNTHESIS BI: CPT

## 2019-02-13 ENCOUNTER — HOSPITAL ENCOUNTER (OUTPATIENT)
Dept: LAB | Age: 54
Discharge: HOME | End: 2019-02-13

## 2019-02-13 ENCOUNTER — OFFICE VISIT (OUTPATIENT)
Dept: OBGYN CLINIC | Age: 54
End: 2019-02-13

## 2019-02-13 VITALS
SYSTOLIC BLOOD PRESSURE: 116 MMHG | WEIGHT: 246 LBS | BODY MASS INDEX: 35.22 KG/M2 | DIASTOLIC BLOOD PRESSURE: 72 MMHG | HEIGHT: 70 IN

## 2019-02-13 DIAGNOSIS — N93.9 ABNORMAL UTERINE BLEEDING: Primary | ICD-10-CM

## 2019-02-13 PROBLEM — E66.01 SEVERE OBESITY (HCC): Status: ACTIVE | Noted: 2019-02-13

## 2019-02-13 RX ORDER — MEDROXYPROGESTERONE ACETATE 10 MG/1
10 TABLET ORAL DAILY
Qty: 10 TAB | Refills: 0 | Status: SHIPPED | OUTPATIENT
Start: 2019-02-13 | End: 2019-02-23

## 2019-02-13 NOTE — PROGRESS NOTES
Postmenopausal bleeding note      Author Klein is a 47 y.o. female who complains of vaginal bleeding after menopause. She was referred by PCP. She became menopausal approximately 2/2017. Periods just abruptly stopped    She has had vaginal bleeding which she describes as heavy, with cramping lasting up to 5 days in December 2018, no bleeding in January 2019, and spotting started last week again. Pad or tampon count: changes every few hours. Associated symptoms include headache and cramping. .    Alleviating factors: none    Aggravating factors: none      The patient is sexually active. Last Pap smear:normal/-HPV 3/15/2017. Her relevant past medical history:   Past Medical History:   Diagnosis Date    Alopecia 6/11/2010    Depression 6/11/2010    Kidney stone     Kidney stone     Migraine headache 6/11/2010    Nodule of left lung 5-20-13    left lower lobe 4mm         Past Surgical History:   Procedure Laterality Date    HX CHOLECYSTECTOMY  1997    HX HERNIA REPAIR  1976    HX TONSILLECTOMY  1996     Social History     Occupational History    Not on file   Tobacco Use    Smoking status: Never Smoker    Smokeless tobacco: Never Used   Substance and Sexual Activity    Alcohol use: Yes     Comment: VERY RARELY    Drug use: No    Sexual activity: Yes     Partners: Male     Comment:  HAS VASTECTOMY     Family History   Problem Relation Age of Onset    Arthritis-osteo Mother     Hypertension Father     Heart Disease Father     Heart Failure Father        No Known Allergies  Prior to Admission medications    Medication Sig Start Date End Date Taking?  Authorizing Provider   SUMAtriptan (IMITREX) 100 mg tablet TAKE 1 TABLET ONCE AS NEEDED FOR MIGRAINE FOR ONE DOSE 7/12/17  Yes Karla Easton MD        Review of Systems - History obtained from the patient  Constitutional: negative for weight loss, fever, night sweats  HEENT: negative for hearing loss, earache, congestion, snoring, sorethroat  CV: negative for chest pain, palpitations, edema  Resp: negative for cough, shortness of breath, wheezing  Breast: negative for breast lumps, nipple discharge, galactorrhea  GI: negative for change in bowel habits, abdominal pain, black or bloody stools  : negative for frequency, dysuria, hematuria  MSK: negative for back pain, joint pain, muscle pain  Skin: negative for itching, rash, hives  Neuro: negative for dizziness, headache, confusion, weakness  Psych: negative for anxiety, depression, change in mood  Heme/lymph: negative for bleeding, bruising, pallor      Objective:  Visit Vitals  /72   Ht 5' 10\" (1.778 m)   Wt 246 lb (111.6 kg)   BMI 35.30 kg/m²       Physical Exam:   PHYSICAL EXAMINATION    Constitutional  · Appearance: well-nourished, well developed, alert, in no acute distress    Gastrointestinal  · Abdominal Examination: abdomen non-tender to palpation, normal bowel sounds, no masses present  · Liver and spleen: no hepatomegaly present, spleen not palpable  · Hernias: no hernias identified    Genitourinary  · External Genitalia: normal appearance for age, no discharge present, no tenderness present, no inflammatory lesions present, no masses present, no atrophy present  · Vagina: normal vaginal vault without central or paravaginal defects, no discharge present, no inflammatory lesions present, no masses present  · Bladder: non-tender to palpation  · Urethra: appears normal  · Cervix: normal   · Uterus: normal size, shape and consistency  · Adnexa: no adnexal tenderness present, no adnexal masses present  · Perineum: perineum within normal limits, no evidence of trauma, no rashes or skin lesions present  · Anus: anus within normal limits, no hemorrhoids present  · Inguinal Lymph Nodes: no lymphadenopathy present    Skin  · General Inspection: no rash, no lesions identified    Neurologic/Psychiatric  · Mental Status:  · Orientation: grossly oriented to person, place and time  · Mood and Affect: mood normal, affect appropriate    Assessment:   AUB - suspect may not actually be menopausal  obesity  Plan:   Endometrial bx done  Provera 10mg x 10d - mychart with update on withdrawal bleed. Consider SIS if looking like  bleeding        Instructions given to pt. Handouts given to pt. [facility]  OFFICE PROCEDURE PROGRESS NOTE        Chart reviewed for the following:   Bonnie Garcia MD, have reviewed the History, Physical and updated the Allergic reactions for [address] performed immediately prior to start of procedure:   Bonnie Garcia MD, have performed the following reviews on Tone Perry prior to the start of the procedure:            * Patient was identified by name and date of birth   * Agreement on procedure being performed was verified  * Risks and Benefits explained to the patient  * Procedure site verified and marked as necessary  * Patient was positioned for comfort  * Consent was signed and verified     Time: 2:53 PM        Date of procedure: 2019    Procedure performed by:  Houston Granda MD    How tolerated by patient: tolerated the procedure well with no complications    Post Procedural Pain Scale: 2 - Hurts Little Bit    Comments: none    Procedure note: Endometrial biopsy    Tone Perry is a ,  47 y.o. female [facility] whose No LMP recorded. Patient is not currently having periods (Reason: Menopause). was on . The patient has a history of The encounter diagnosis was Abnormal uterine bleeding. and presents for an endometrial biopsy. Indications:   After the indications, risks, benefits, and alternatives to performing an endometrial biopsy were explained to the patient, her questions were answered and informed consent was obtained. Procedure: The patient was placed on the table in the dorsal lithotomy position. A bimanual exam showed the uterus to be anterior. The uterus was normal size.   A speculum was placed in the vagina. The cervix was visualized and prepped with zephrin. A tenaculum was not placed on the anterior lip of the cervix for traction. It was not necessary to dilate the cervix. A pipelle was passed through the endocervical canal without difficulty. The uterus was sounded to 8 cm's. A moderate amount of tissue was returned. This tissue was placed in formalin and sent to pathology. It was felt that an adequate sample was obtained. due to cervical stenosis. The patient tolerated the procedure well and she reported mild cramping. The speculum was removed. Post Procedural Status: The patient was observed for 10  minutes. She had mild cramping at the time of discharge. There were no complications. The patient was discharged in stable condition.

## 2019-04-08 ENCOUNTER — OFFICE VISIT (OUTPATIENT)
Dept: OBGYN CLINIC | Age: 54
End: 2019-04-08

## 2019-04-08 DIAGNOSIS — N93.9 ABNORMAL UTERINE BLEEDING: Primary | ICD-10-CM

## 2019-04-08 NOTE — PATIENT INSTRUCTIONS

## 2019-04-08 NOTE — PROGRESS NOTES
[facility]  OFFICE PROCEDURE PROGRESS NOTE        Chart reviewed for the following:   I April Loretta, have reviewed the History, Physical and updated the Allergic reactions for [address] performed immediately prior to start of procedure:   STEW April Loretta, have performed the following reviews on Rafa Moreland prior to the start of the procedure:            * Patient was identified by name and date of birth   * Agreement on procedure being performed was verified  * Risks and Benefits explained to the patient  * Procedure site verified and marked as necessary  * Patient was positioned for comfort  * Consent was signed and verified     Time: 4:01pm      Date of procedure: 2019    Procedure performed by:  Roger Kawasaki, MD    Patient assisted by: self    How tolerated by patient: tolerated the procedure well with no complications    Post Procedural Pain Scale: 0 - No Hurt      SONOHYSTEROGRAPHY    Rafa Moreland is a ,  47 y.o. female [facility] whose No LMP recorded. (Menstrual status: Menopause). EMB showed proliferative endometrium. , presents for a sonohysterography. The indications for this procedure were reviewed with the patient. The procedure was explained in detail and all questions were answered. Procedure: The patient was placed in the lithotomy position. A graves speculum was introduced into the vagina and the cervix was visualized. The cervix was prepped with iodine solution. A Cook's Hysterography catheter was then introduced into the uterine cavity and the speculum was removed. Sterile sonohysterography with 3D Reconstruction was performed. The endometrial cavity was distended with sterile saline. The findings are as follows: normal thin cavity no lesions  The patient tolerated the procedure well without complication, and was discharged to home.

## 2019-10-16 ENCOUNTER — LAB ONLY (OUTPATIENT)
Dept: OBGYN CLINIC | Age: 54
End: 2019-10-16

## 2019-10-16 DIAGNOSIS — N93.8 DUB (DYSFUNCTIONAL UTERINE BLEEDING): Primary | ICD-10-CM

## 2019-10-17 LAB
ESTRADIOL SERPL-MCNC: 15.9 PG/ML
FSH SERPL-ACNC: 52.7 MIU/ML
LH SERPL-ACNC: 20.4 MIU/ML
TSH SERPL DL<=0.005 MIU/L-ACNC: 1.27 UIU/ML (ref 0.45–4.5)

## 2019-11-14 ENCOUNTER — TELEPHONE (OUTPATIENT)
Dept: FAMILY MEDICINE CLINIC | Age: 54
End: 2019-11-14

## 2019-11-14 NOTE — TELEPHONE ENCOUNTER
Patient came in. She is going to [facility] on Dec. 18 and needs the malaria prescription. I spoke with Dr. Gillian Yo she says that she will review it and let the patient know if it's approved or not.

## 2020-01-06 RX ORDER — SUMATRIPTAN 100 MG/1
TABLET, FILM COATED ORAL
Qty: 12 TAB | Refills: 0 | OUTPATIENT
Start: 2020-01-06

## 2021-01-22 ENCOUNTER — TRANSCRIBE ORDER (OUTPATIENT)
Dept: SCHEDULING | Age: 56
End: 2021-01-22

## 2021-01-22 DIAGNOSIS — Z12.31 SCREENING MAMMOGRAM FOR HIGH-RISK PATIENT: Primary | ICD-10-CM

## 2021-01-29 ENCOUNTER — TRANSCRIBE ORDER (OUTPATIENT)
Dept: SCHEDULING | Age: 56
End: 2021-01-29

## 2021-01-29 DIAGNOSIS — Z12.31 VISIT FOR SCREENING MAMMOGRAM: Primary | ICD-10-CM

## 2021-02-16 ENCOUNTER — HOSPITAL ENCOUNTER (OUTPATIENT)
Dept: MAMMOGRAPHY | Age: 56
Discharge: HOME | End: 2021-02-16
Attending: FAMILY MEDICINE
Payer: COMMERCIAL

## 2021-02-16 DIAGNOSIS — Z12.31 VISIT FOR SCREENING MAMMOGRAM: ICD-10-CM

## 2021-02-16 PROCEDURE — 77063 BREAST TOMOSYNTHESIS BI: CPT

## 2021-07-29 ENCOUNTER — OFFICE VISIT (OUTPATIENT)
Dept: OBGYN CLINIC | Age: 56
End: 2021-07-29
Payer: COMMERCIAL

## 2021-07-29 VITALS — BODY MASS INDEX: 34.21 KG/M2 | WEIGHT: 238.4 LBS | SYSTOLIC BLOOD PRESSURE: 142 MMHG | DIASTOLIC BLOOD PRESSURE: 84 MMHG

## 2021-07-29 DIAGNOSIS — Z01.419 ENCOUNTER FOR GYNECOLOGICAL EXAMINATION (GENERAL) (ROUTINE) WITHOUT ABNORMAL FINDINGS: Primary | ICD-10-CM

## 2021-07-29 PROCEDURE — 99396 PREV VISIT EST AGE 40-64: CPT | Performed by: OBSTETRICS & GYNECOLOGY

## 2021-07-29 RX ORDER — NITROGLYCERIN 0.3 MG/1
TABLET SUBLINGUAL
COMMUNITY
Start: 2021-05-28

## 2021-07-29 RX ORDER — PEN NEEDLE, DIABETIC 31 GX3/16"
NEEDLE, DISPOSABLE MISCELLANEOUS
COMMUNITY
Start: 2021-02-01

## 2021-07-29 RX ORDER — PEN NEEDLE, DIABETIC 32GX 5/32"
NEEDLE, DISPOSABLE MISCELLANEOUS
COMMUNITY
Start: 2021-07-05

## 2021-07-29 NOTE — PROGRESS NOTES
Katlyn Godinez is a ,  48 y.o. female [address] [facility]  who presents for her annual checkup. She is having no significant problems. Moving to [place] for work! Menstrual status:    Her periods are absent    She denies dysmenorrhea. She reports no premenstrual symptoms. The patient is not using HRT. Contraception:    The current method of family planning is post menopausal status. Sexual history:    She  reports being sexually active and has had partner(s) who are Male. Medical conditions:    Since her last annual GYN exam about three or more years ago, she has had the following changes in her health history: none. Pap and Mammogram History:    Her most recent Pap smear was 3/15/2017 normal/HPV neg    The patient had a recent mammogram 2021 which was negative for malignancy. Breast Cancer History/Substance Abuse:    She has no family history of breast cancer. Osteoporosis History:    Family history does not include a first or second degree relative with osteopenia or osteoporosis. Past Medical History:   Diagnosis Date    Alopecia 2010    Depression 2010    Kidney stone     Kidney stone     Migraine headache 2010    Nodule of left lung 5-20-    left lower lobe 4mm      Past Surgical History:   Procedure Laterality Date    HX CHOLECYSTECTOMY      HX HERNIA REPAIR      HX TONSILLECTOMY       Current Outpatient Medications   Medication Sig Dispense Refill    nitroglycerin (NITROSTAT) 0.3 mg SL tablet TAKE 1 TABLET BY MOUTH EVERY 2 HOURS AS NEEDED FOR ESOPHAGEAL SPASM      BD Oneyda 2nd Gen Pen Needle 32 gauge x 5/32\" ndle USE DAIILY WITH SAXENDA PEN      Insulin Needles, Disposable, (BD Oneyda 2nd Gen Pen Needle) 32 gauge x 5/32\" ndle USE DAIILY WITH SAXENDA PEN      SUMAtriptan (IMITREX) 100 mg tablet TAKE 1 TABLET ONCE AS NEEDED FOR MIGRAINE FOR ONE DOSE 12 Tab 0     Allergies: Patient has no known allergies.    Social History Socioeconomic History    Marital status:      Spouse name: Not on file    Number of children: Not on file    Years of education: Not on file    Highest education level: Not on file   Occupational History    Not on file   Tobacco Use    Smoking status: Never Smoker    Smokeless tobacco: Never Used   Substance and Sexual Activity    Alcohol use: Yes     Comment: VERY RARELY    Drug use: No    Sexual activity: Yes     Partners: Male     Comment:  HAS VASTECTOMY   Other Topics Concern    Not on file   Social History Narrative    Not on file     Social Determinants of Health     Financial Resource Strain:     Difficulty of Paying Living Expenses:    Food Insecurity:     Worried About Running Out of Food in the Last Year:     [address] in the Last Year:    Transportation Needs:     Lack of Transportation (Medical):  Lack of Transportation (Non-Medical):    Physical Activity:     Days of Exercise per Week:     Minutes of Exercise per Session:    Stress:     Feeling of Stress :    Social Connections:     Frequency of Communication with Friends and Family:     Frequency of Social Gatherings with Friends and Family:     Attends Mosque Services:     Active Member of Clubs or Organizations:     Attends Club or Organization Meetings:     Marital Status:    Intimate Partner Violence:     Fear of Current or Ex-Partner:     Emotionally Abused:     Physically Abused:     Sexually Abused: Tobacco History:  reports that she has never smoked. She has never used smokeless tobacco.  Alcohol Abuse:  reports current alcohol use. Drug Abuse:  reports no history of drug use.   Patient Active Problem List   Diagnosis Code    Depression F32.9    Migraine headache G43.909    Lung nodule R91.1    Breast cancer screening Z12.39    Colon cancer screening Z12.11    Oral phase dysphagia R13.11    Vitamin D deficiency E55.9    BMI 36.0-36.9,adult Z68.36    Severe obesity ([address] E66.01         Review of Systems - History obtained from the patient  Constitutional: negative for weight loss, fever, night sweats  HEENT: negative for hearing loss, earache, congestion, snoring, sorethroat  CV: negative for chest pain, palpitations, edema  Resp: negative for cough, shortness of breath, wheezing  GI: negative for change in bowel habits, abdominal pain, black or bloody stools  : negative for frequency, dysuria, hematuria, vaginal discharge  MSK: negative for back pain, joint pain, muscle pain  Breast: negative for breast lumps, nipple discharge, galactorrhea  Skin :negative for itching, rash, hives  Neuro: negative for dizziness, headache, confusion, weakness  Psych: negative for anxiety, depression, change in mood  Heme/lymph: negative for bleeding, bruising, pallor    Physical Exam    Visit Vitals  BP (!) 142/84   Wt 238 lb 6.4 oz (108.1 kg)   BMI 34.21 kg/m²     Constitutional  · Appearance: well-nourished, well developed, alert, in no acute distress    HENT  · Head and Face: appears normal    Neck  · Inspection/Palpation: normal appearance, no masses or tenderness  · Lymph Nodes: no lymphadenopathy present  · Thyroid: gland size normal, nontender, no nodules or masses present on palpation    Chest  · Respiratory Effort: breathing normal  · Auscultation: normal breath sounds    Cardiovascular  · Heart:  · Auscultation: regular rate and rhythm without murmur    Breasts  · Inspection of Breasts: breasts symmetrical, no skin changes, no discharge present, nipple appearance normal, no skin retraction present  · Palpation of Breasts and Axillae: no masses present on palpation, no breast tenderness  · Axillary Lymph Nodes: no lymphadenopathy present    Gastrointestinal  · Abdominal Examination: abdomen non-tender to palpation, normal bowel sounds, no masses present  · Liver and spleen: no hepatomegaly present, spleen not palpable  · Hernias: no hernias identified    Skin  · General Inspection: no rash, no lesions identified    Neurologic/Psychiatric  · Mental Status:  · Orientation: grossly oriented to person, place and time  · Mood and Affect: mood normal, affect appropriate    Genitourinary  · External Genitalia: normal appearance for age, no discharge present, no tenderness present, no inflammatory lesions present, no masses present, no atrophy present  · Vagina: normal vaginal vault without central or paravaginal defects, no discharge present, no inflammatory lesions present, no masses present  · Bladder: non-tender to palpation  · Urethra: appears normal  · Cervix: normal   · Uterus: normal size, shape and consistency  · Adnexa: no adnexal tenderness present, no adnexal masses present  · Perineum: perineum within normal limits, no evidence of trauma, no rashes or skin lesions present  · Anus: anus within normal limits, no hemorrhoids present  · Inguinal Lymph Nodes: no lymphadenopathy present    Assessment:  Routine gynecologic examination  Her current medical status is satisfactory with no evidence of significant gynecologic issues.     Plan:  Counseled re: diet, exercise, healthy lifestyle  Return for yearly wellness visits  Rec annual mammogram

## 2021-10-18 ENCOUNTER — TELEPHONE (OUTPATIENT)
Dept: FAMILY MEDICINE CLINIC | Age: 56
End: 2021-10-18